# Patient Record
Sex: MALE | Race: WHITE | NOT HISPANIC OR LATINO
[De-identification: names, ages, dates, MRNs, and addresses within clinical notes are randomized per-mention and may not be internally consistent; named-entity substitution may affect disease eponyms.]

---

## 2020-06-12 ENCOUNTER — ERX REFILL RESPONSE (OUTPATIENT)
Age: 65
End: 2020-06-12

## 2020-06-12 RX ORDER — METOCLOPRAMIDE 5 MG/1
TAKE 1 TABLET BY ORAL ROUTE TID FOR 14 TABLET ORAL
Qty: 42 | Refills: 1

## 2020-09-15 ENCOUNTER — TELEPHONE ENCOUNTER (OUTPATIENT)
Dept: URBAN - METROPOLITAN AREA CLINIC 54 | Facility: CLINIC | Age: 65
End: 2020-09-15

## 2020-09-15 RX ORDER — METOCLOPRAMIDE 5 MG/1
TAKE 1 TABLET BY ORAL ROUTE TID TABLET ORAL THREE TIMES A DAY
Qty: 90 | Refills: 0

## 2020-10-28 ENCOUNTER — TELEPHONE ENCOUNTER (OUTPATIENT)
Dept: URBAN - METROPOLITAN AREA CLINIC 54 | Facility: CLINIC | Age: 65
End: 2020-10-28

## 2020-10-28 RX ORDER — FAMOTIDINE 40 MG/1
1 TABLET AT BEDTIME TABLET, FILM COATED ORAL ONCE A DAY
Qty: 90 TABLET | Refills: 2 | OUTPATIENT
Start: 2020-10-28

## 2020-11-10 ENCOUNTER — OFFICE VISIT (OUTPATIENT)
Dept: RURAL CLINIC 6 | Facility: CLINIC | Age: 65
End: 2020-11-10

## 2020-11-10 RX ORDER — NEBIVOLOL HYDROCHLORIDE 5 MG/1
1/2 TABLET DAILY TABLET ORAL
Qty: 0 | Refills: 0 | COMMUNITY
Start: 1900-01-01

## 2020-11-10 RX ORDER — PROMETHAZINE HYDROCHLORIDE 25 MG/1
TAKE 1 TABLET (25 MG) BY ORAL ROUTE ONCE DAILY AT BEDTIME FOR 30 DAYS TABLET ORAL
Qty: 30 | Refills: 11 | COMMUNITY
Start: 2018-09-05

## 2020-11-10 RX ORDER — VALSARTAN AND HYDROCHLOROTHIAZIDE 320; 25 MG/1; MG/1
TABLET, FILM COATED ORAL
Qty: 0 | Refills: 0 | COMMUNITY
Start: 1900-01-01

## 2020-11-10 RX ORDER — FAMOTIDINE 40 MG/1
TAKE 1 TABLET (40 MG) BY ORAL ROUTE ONCE DAILY AT BEDTIME TABLET ORAL 1
Qty: 0 | Refills: 0 | COMMUNITY
Start: 1900-01-01

## 2020-11-10 RX ORDER — ASPIRIN 81 MG/1
TAKE 1 TABLET (81 MG) BY ORAL ROUTE ONCE DAILY TABLET, COATED ORAL 1
Qty: 0 | Refills: 0 | COMMUNITY
Start: 1900-01-01

## 2020-11-10 RX ORDER — OMEPRAZOLE 40 MG/1
TAKE 1 CAPSULE TWICE A DAY 30 MINUTES PRIOR TO FIRST AND LAST MEALS CAPSULE, DELAYED RELEASE PELLETS ORAL
Qty: 60 | Refills: 11 | COMMUNITY
Start: 2019-10-11

## 2020-11-10 RX ORDER — METOCLOPRAMIDE 5 MG/1
TAKE 1 TABLET BY ORAL ROUTE TID TABLET ORAL THREE TIMES A DAY
Qty: 90 | Refills: 0 | COMMUNITY

## 2020-11-10 RX ORDER — AMLODIPINE BESYLATE 10 MG/1
TAKE 1 TABLET (10 MG) BY ORAL ROUTE ONCE DAILY TABLET ORAL 1
Qty: 0 | Refills: 0 | COMMUNITY
Start: 1900-01-01

## 2020-11-10 RX ORDER — ATORVASTATIN CALCIUM 10 MG/1
TAKE 1 TABLET (10 MG) BY ORAL ROUTE ONCE DAILY TABLET, FILM COATED ORAL 1
Qty: 0 | Refills: 0 | COMMUNITY
Start: 1900-01-01

## 2020-11-10 RX ORDER — GABAPENTIN 600 MG
TAKE 1 TABLET (600 MG) BY ORAL ROUTE 3 TIMES PER DAY TABLET ORAL
Qty: 0 | Refills: 0 | COMMUNITY
Start: 1900-01-01

## 2020-11-10 RX ORDER — BAICALIN/CATECHIN 500 MG
CAPSULE ORAL
Qty: 0 | Refills: 0 | COMMUNITY
Start: 1900-01-01

## 2020-11-10 RX ORDER — HYDRALAZINE HYDROCHLORIDE 25 MG/1
TABLET ORAL
Qty: 0 | Refills: 0 | COMMUNITY
Start: 1900-01-01

## 2020-11-10 RX ORDER — ONDANSETRON 4 MG/1
DISSOLVE  1 TABLET BY ORAL ROUTE Q6H PRN NAUSEA TABLET, ORALLY DISINTEGRATING ORAL
Qty: 30 | Refills: 11 | COMMUNITY
Start: 2020-05-12 | End: 2021-05-07

## 2020-11-10 RX ORDER — FAMOTIDINE 40 MG/1
1 TABLET AT BEDTIME TABLET, FILM COATED ORAL ONCE A DAY
Qty: 90 TABLET | Refills: 2 | COMMUNITY
Start: 2020-10-28

## 2020-11-10 RX ORDER — SERTRALINE 100 MG/1
TABLET, FILM COATED ORAL
Qty: 0 | Refills: 0 | COMMUNITY
Start: 1900-01-01

## 2020-11-10 RX ORDER — TIZANIDINE HYDROCHLORIDE 4 MG/1
CAPSULE ORAL
Qty: 0 | Refills: 0 | COMMUNITY
Start: 1900-01-01

## 2020-11-17 ENCOUNTER — OFFICE VISIT (OUTPATIENT)
Dept: RURAL CLINIC 6 | Facility: CLINIC | Age: 65
End: 2020-11-17

## 2020-11-17 RX ORDER — FAMOTIDINE 40 MG/1
1 TABLET AT BEDTIME TABLET, FILM COATED ORAL ONCE A DAY
Qty: 90 TABLET | Refills: 2 | COMMUNITY
Start: 2020-10-28

## 2020-11-17 RX ORDER — ATORVASTATIN CALCIUM 10 MG/1
TAKE 1 TABLET (10 MG) BY ORAL ROUTE ONCE DAILY TABLET, FILM COATED ORAL 1
Qty: 0 | Refills: 0 | COMMUNITY
Start: 1900-01-01

## 2020-11-17 RX ORDER — GABAPENTIN 600 MG
TAKE 1 TABLET (600 MG) BY ORAL ROUTE 3 TIMES PER DAY TABLET ORAL
Qty: 0 | Refills: 0 | COMMUNITY
Start: 1900-01-01

## 2020-11-17 RX ORDER — HYDRALAZINE HYDROCHLORIDE 25 MG/1
TABLET ORAL
Qty: 0 | Refills: 0 | COMMUNITY
Start: 1900-01-01

## 2020-11-17 RX ORDER — FAMOTIDINE 40 MG/1
TAKE 1 TABLET (40 MG) BY ORAL ROUTE ONCE DAILY AT BEDTIME TABLET ORAL 1
Qty: 0 | Refills: 0 | COMMUNITY
Start: 1900-01-01

## 2020-11-17 RX ORDER — OMEPRAZOLE 40 MG/1
TAKE 1 CAPSULE TWICE A DAY 30 MINUTES PRIOR TO FIRST AND LAST MEALS CAPSULE, DELAYED RELEASE PELLETS ORAL
Qty: 60 | Refills: 11 | COMMUNITY
Start: 2019-10-11

## 2020-11-17 RX ORDER — AMLODIPINE BESYLATE 10 MG/1
TAKE 1 TABLET (10 MG) BY ORAL ROUTE ONCE DAILY TABLET ORAL 1
Qty: 0 | Refills: 0 | COMMUNITY
Start: 1900-01-01

## 2020-11-17 RX ORDER — SERTRALINE 100 MG/1
TABLET, FILM COATED ORAL
Qty: 0 | Refills: 0 | COMMUNITY
Start: 1900-01-01

## 2020-11-17 RX ORDER — ASPIRIN 81 MG/1
TAKE 1 TABLET (81 MG) BY ORAL ROUTE ONCE DAILY TABLET, COATED ORAL 1
Qty: 0 | Refills: 0 | COMMUNITY
Start: 1900-01-01

## 2020-11-17 RX ORDER — PROMETHAZINE HYDROCHLORIDE 25 MG/1
TAKE 1 TABLET (25 MG) BY ORAL ROUTE ONCE DAILY AT BEDTIME FOR 30 DAYS TABLET ORAL
Qty: 30 | Refills: 11 | COMMUNITY
Start: 2018-09-05

## 2020-11-17 RX ORDER — NEBIVOLOL HYDROCHLORIDE 5 MG/1
1/2 TABLET DAILY TABLET ORAL
Qty: 0 | Refills: 0 | COMMUNITY
Start: 1900-01-01

## 2020-11-17 RX ORDER — BAICALIN/CATECHIN 500 MG
CAPSULE ORAL
Qty: 0 | Refills: 0 | COMMUNITY
Start: 1900-01-01

## 2020-11-17 RX ORDER — TIZANIDINE HYDROCHLORIDE 4 MG/1
CAPSULE ORAL
Qty: 0 | Refills: 0 | COMMUNITY
Start: 1900-01-01

## 2020-11-17 RX ORDER — VALSARTAN AND HYDROCHLOROTHIAZIDE 320; 25 MG/1; MG/1
TABLET, FILM COATED ORAL
Qty: 0 | Refills: 0 | COMMUNITY
Start: 1900-01-01

## 2020-11-17 RX ORDER — METOCLOPRAMIDE 5 MG/1
TAKE 1 TABLET BY ORAL ROUTE TID TABLET ORAL THREE TIMES A DAY
Qty: 90 | Refills: 0 | COMMUNITY

## 2020-11-17 RX ORDER — ONDANSETRON 4 MG/1
DISSOLVE  1 TABLET BY ORAL ROUTE Q6H PRN NAUSEA TABLET, ORALLY DISINTEGRATING ORAL
Qty: 30 | Refills: 11 | COMMUNITY
Start: 2020-05-12 | End: 2021-05-07

## 2020-12-09 ENCOUNTER — ERX REFILL RESPONSE (OUTPATIENT)
Age: 65
End: 2020-12-09

## 2020-12-09 RX ORDER — OMEPRAZOLE 40 MG/1
TAKE 1 CAPSULE TWICE A DAY 30 MINUTES PRIOR TO FIRST AND LAST MEALS CAPSULE, DELAYED RELEASE ORAL
Qty: 60 | Refills: 0

## 2020-12-22 ENCOUNTER — TELEPHONE ENCOUNTER (OUTPATIENT)
Dept: RURAL CLINIC 6 | Facility: CLINIC | Age: 65
End: 2020-12-22

## 2020-12-22 RX ORDER — METOCLOPRAMIDE 5 MG/1
TAKE 1 TABLET BY ORAL ROUTE TABLET ORAL THREE TIMES A DAY
Qty: 90 | Refills: 0

## 2021-01-05 ENCOUNTER — OFFICE VISIT (OUTPATIENT)
Dept: RURAL CLINIC 6 | Facility: CLINIC | Age: 66
End: 2021-01-05
Payer: MEDICARE

## 2021-01-05 DIAGNOSIS — Z12.11 COLON CANCER SCREENING: ICD-10-CM

## 2021-01-05 DIAGNOSIS — K31.84 GASTROPARESIS: ICD-10-CM

## 2021-01-05 PROCEDURE — G8427 DOCREV CUR MEDS BY ELIG CLIN: HCPCS | Performed by: INTERNAL MEDICINE

## 2021-01-05 PROCEDURE — G9903 PT SCRN TBCO ID AS NON USER: HCPCS | Performed by: INTERNAL MEDICINE

## 2021-01-05 PROCEDURE — 1036F TOBACCO NON-USER: CPT | Performed by: INTERNAL MEDICINE

## 2021-01-05 PROCEDURE — G8420 CALC BMI NORM PARAMETERS: HCPCS | Performed by: INTERNAL MEDICINE

## 2021-01-05 PROCEDURE — 99214 OFFICE O/P EST MOD 30 MIN: CPT | Performed by: INTERNAL MEDICINE

## 2021-01-05 PROCEDURE — 3017F COLORECTAL CA SCREEN DOC REV: CPT | Performed by: INTERNAL MEDICINE

## 2021-01-05 PROCEDURE — G8483 FLU IMM NO ADMIN DOC REA: HCPCS | Performed by: INTERNAL MEDICINE

## 2021-01-05 RX ORDER — FAMOTIDINE 40 MG/1
1 TABLET AT BEDTIME TABLET, FILM COATED ORAL ONCE A DAY
Qty: 90 TABLET | Refills: 2 | Status: ACTIVE | COMMUNITY
Start: 2020-10-28

## 2021-01-05 RX ORDER — SERTRALINE 100 MG/1
TABLET, FILM COATED ORAL
Qty: 0 | Refills: 0 | Status: ACTIVE | COMMUNITY
Start: 1900-01-01

## 2021-01-05 RX ORDER — ATORVASTATIN CALCIUM 10 MG/1
TAKE 1 TABLET (10 MG) BY ORAL ROUTE ONCE DAILY TABLET, FILM COATED ORAL 1
Qty: 0 | Refills: 0 | Status: ACTIVE | COMMUNITY
Start: 1900-01-01

## 2021-01-05 RX ORDER — FAMOTIDINE 40 MG/1
TAKE 1 TABLET (40 MG) BY ORAL ROUTE ONCE DAILY AT BEDTIME TABLET ORAL 1
Qty: 0 | Refills: 0 | Status: ACTIVE | COMMUNITY
Start: 1900-01-01

## 2021-01-05 RX ORDER — GABAPENTIN 600 MG
TAKE 1 TABLET (600 MG) BY ORAL ROUTE 3 TIMES PER DAY TABLET ORAL
Qty: 0 | Refills: 0 | Status: ACTIVE | COMMUNITY
Start: 1900-01-01

## 2021-01-05 RX ORDER — VALSARTAN AND HYDROCHLOROTHIAZIDE 320; 25 MG/1; MG/1
TABLET, FILM COATED ORAL
Qty: 0 | Refills: 0 | Status: ACTIVE | COMMUNITY
Start: 1900-01-01

## 2021-01-05 RX ORDER — NEBIVOLOL HYDROCHLORIDE 5 MG/1
1/2 TABLET DAILY TABLET ORAL
Qty: 0 | Refills: 0 | Status: ACTIVE | COMMUNITY
Start: 1900-01-01

## 2021-01-05 RX ORDER — HYDRALAZINE HYDROCHLORIDE 25 MG/1
TABLET ORAL
Qty: 0 | Refills: 0 | Status: ACTIVE | COMMUNITY
Start: 1900-01-01

## 2021-01-05 RX ORDER — ASPIRIN 81 MG/1
TAKE 1 TABLET (81 MG) BY ORAL ROUTE ONCE DAILY TABLET, COATED ORAL 1
Qty: 0 | Refills: 0 | Status: ACTIVE | COMMUNITY
Start: 1900-01-01

## 2021-01-05 RX ORDER — OMEPRAZOLE 40 MG/1
TAKE 1 CAPSULE TWICE A DAY 30 MINUTES PRIOR TO FIRST AND LAST MEALS CAPSULE, DELAYED RELEASE ORAL
Qty: 60 | Refills: 0 | Status: ACTIVE | COMMUNITY

## 2021-01-05 RX ORDER — METOCLOPRAMIDE 5 MG/1
TAKE 1 TABLET BY ORAL ROUTE TABLET ORAL THREE TIMES A DAY
Qty: 90 | Refills: 0 | Status: ACTIVE | COMMUNITY

## 2021-01-05 RX ORDER — BAICALIN/CATECHIN 500 MG
CAPSULE ORAL
Qty: 0 | Refills: 0 | Status: ACTIVE | COMMUNITY
Start: 1900-01-01

## 2021-01-05 RX ORDER — PROMETHAZINE HYDROCHLORIDE 25 MG/1
TAKE 1 TABLET (25 MG) BY ORAL ROUTE ONCE DAILY AT BEDTIME FOR 30 DAYS TABLET ORAL
Qty: 30 | Refills: 11 | Status: ACTIVE | COMMUNITY
Start: 2018-09-05

## 2021-01-05 RX ORDER — TIZANIDINE HYDROCHLORIDE 4 MG/1
CAPSULE ORAL
Qty: 0 | Refills: 0 | Status: ACTIVE | COMMUNITY
Start: 1900-01-01

## 2021-01-05 RX ORDER — ONDANSETRON 4 MG/1
DISSOLVE  1 TABLET BY ORAL ROUTE Q6H PRN NAUSEA TABLET, ORALLY DISINTEGRATING ORAL
Qty: 30 | Refills: 11 | Status: ACTIVE | COMMUNITY
Start: 2020-05-12 | End: 2021-05-07

## 2021-01-05 RX ORDER — AMLODIPINE BESYLATE 10 MG/1
TAKE 1 TABLET (10 MG) BY ORAL ROUTE ONCE DAILY TABLET ORAL 1
Qty: 0 | Refills: 0 | Status: ACTIVE | COMMUNITY
Start: 1900-01-01

## 2021-01-05 NOTE — HPI-TODAY'S VISIT:
65-year-old man with chronic gastroparesis on long-term low-dose metoclopramide.  He has significant nausea when he stops the metoclopramide.  He does make periodic efforts to lower the dose or discontinue it.  There have been no abdominal pain or dysphagia.  Heartburn is well controlled with famotidine and/or omeprazole he takes an occasional promethazine or ondansetron his heart has been stable he has no other major health issues he has had no gross psychiatric side effects from the metoclopramide.  With this visit we have had a discussion about the long-term risk of tardive dyskinesia and the possibility of permanent tardive dyskinesia even after the drug discontinuance.  Bowels are moving regularly.  His last colonoscopy was in 2011 and will be due later this year.  I will postpone this exam until later in the year 2 hopefully perform the procedure with the coronavirus and better control.

## 2021-01-05 NOTE — HPI-OTHER HISTORIES
>> prior visit The patient is a 60 year old /White male , who presents in follow-up for further management of his chronic early AM nausea without emesis. He has also noted postprandial epigastric pain. When I last saw him on 6/29/16 he had noted intermittent feeling a "knot" in his abdomen epigastrically. It was a pain. He saw a GI in [place] and had an EGD/CT chest/abdomen that were unremarkable. He stated he was not prescribed any medication. He noted some occasional nausea at that time. Denied emesis. When he had epigastric pain it could last up to a 1/2 day. It was usually after eating and occured more after fried food. He notes today his nausea is unchanged. RUQ US and gastric empting scan were unremarkable. More recent HIDA noted a decreased EF and administration of Nepro reproduced his discomfort. He had his gallbladder removed after Sept 2015. He noted his symptoms were better. He could have an urgent BM. 7/13/16 EGD - benign fundic gland polyps. Otherwise, normal. Biopsies unremarkable. 9/6/16 TSH/FT4, CBC, CMP, CRP normal. Cortisol 7.7 in AM. CT sinuses 9/9/16 - no evidence of inflammatory sinus disease. He has continued on omeprazole 40 mg BID and ranitidine 150 mg at bedtime. He was started on Reglan 10 mg at bedtime on 10/12/16 after being consented where I reviewed the risk of tardive dyskinesia. Past Medical History Disease Name Date Onset Notes arthritis -- -Cataracts, bilateral -- -CDD (congenital diaphragmatic defect) -- -Cervical disc disease -- -CHF (congestive heart failure) -- -CT Scan 01/15/15 Chest/abdomen with contrast - Colonic diverticulosis. Otherwise, unremarkable. Diverticulosis -- -Follow up -- -Gastric Emptying Study 08/11/15 Normal. T1/2 37 mins. GERD/Nausea Today, he is being seen after being lost to follow-up.  He continues on omeprazole 40 mg BID, ranitidine 150 mg at bedtime, Reglan 10 mg at bedtime.  He states he changed his diet and he is eating better.  He has alot less nausea and is not needing Zofran.  He lost weight intentionally. 1/22/19: Patient returns for follow up. He reports acute onset of weakness, tingling, nausea, diaphoresis and chest pain this past Wednesday and was hospitalized at [facility] from 1/16-1/17. He was seen by cardiology and cardiac workup was negative. He followed up with his PCP, Dr. Strong, this past Friday who increased his Reglan to 10 mg BID. He is feeling much better now. He also takes Phenergan nightly, Zofran as needed, omeprazole BID and ranitidine at bedtime. He c/o mild burning in his stomach, otherwise no complaints. FOLLOW UP 4/30/19-PATIENT PRESENTS FOR FOLLOW UP. HE IS DOING WELL. HE IS TAKING OMEPRAZOLE DAILY AND RANITIDINE AT NIGHT. HE IS ON REGLAN ONCE DAILY. DISCUSSED WITH PATIENT TO STOP MEDICATION. HE HAS LOST WEIGHT. >>10/11/2019. He ran out of omeprazole and had recurrent GERD symptoms, without dysphagia. Bowels OK, no bleeding. Back on OTC omeprazole-improved. Also takes ranitidine qhs. Back today mainly for Rx renewal. No further nausea, no abdominal pain. >>5/12/2020 No change. Wt stable. Bowels OK. Minimal abd pain/gas. Occasional GERD symptoms. Occ nausea, No vomiting, no bleeding. Still using metoclopramide 5 mg hs, no side effects

## 2021-01-06 ENCOUNTER — TELEPHONE ENCOUNTER (OUTPATIENT)
Dept: URBAN - METROPOLITAN AREA CLINIC 92 | Facility: CLINIC | Age: 66
End: 2021-01-06

## 2021-01-06 RX ORDER — OMEPRAZOLE 40 MG/1
TAKE 1 CAPSULE TWICE A DAY 30 MINUTES PRIOR TO FIRST AND LAST MEALS CAPSULE, DELAYED RELEASE ORAL
Qty: 60

## 2021-01-06 RX ORDER — METOCLOPRAMIDE 5 MG/1
TAKE 1 TABLET BY ORAL ROUTE TABLET ORAL THREE TIMES A DAY
Qty: 90

## 2021-04-07 ENCOUNTER — TELEPHONE ENCOUNTER (OUTPATIENT)
Dept: URBAN - METROPOLITAN AREA CLINIC 54 | Facility: CLINIC | Age: 66
End: 2021-04-07

## 2021-04-07 RX ORDER — FAMOTIDINE 40 MG/1
TAKE 1 TABLET (40 MG) BY ORAL ROUTE ONCE DAILY AT BEDTIME TABLET ORAL 1
Qty: 90 | Refills: 1

## 2021-04-07 RX ORDER — OMEPRAZOLE 40 MG/1
TAKE 1 CAPSULE TWICE A DAY 30 MINUTES PRIOR TO FIRST AND LAST MEALS CAPSULE, DELAYED RELEASE ORAL TWICE A DAY
Qty: 180 | Refills: 1

## 2021-07-12 ENCOUNTER — TELEPHONE ENCOUNTER (OUTPATIENT)
Dept: URBAN - METROPOLITAN AREA CLINIC 54 | Facility: CLINIC | Age: 66
End: 2021-07-12

## 2021-07-12 RX ORDER — METOCLOPRAMIDE 5 MG/1
TAKE 1 TABLET BY ORAL ROUTE TABLET ORAL THREE TIMES A DAY
Qty: 90 | Refills: 0

## 2021-07-20 ENCOUNTER — WEB ENCOUNTER (OUTPATIENT)
Dept: RURAL CLINIC 2 | Facility: CLINIC | Age: 66
End: 2021-07-20

## 2021-07-20 ENCOUNTER — OFFICE VISIT (OUTPATIENT)
Dept: RURAL CLINIC 6 | Facility: CLINIC | Age: 66
End: 2021-07-20
Payer: MEDICARE

## 2021-07-20 DIAGNOSIS — K31.84 GASTROPARESIS: ICD-10-CM

## 2021-07-20 PROCEDURE — 99203 OFFICE O/P NEW LOW 30 MIN: CPT | Performed by: INTERNAL MEDICINE

## 2021-07-20 RX ORDER — PROMETHAZINE HYDROCHLORIDE 25 MG/1
TAKE 1 TABLET (25 MG) BY ORAL ROUTE ONCE DAILY AT BEDTIME FOR 30 DAYS TABLET ORAL
Qty: 30 | Refills: 11 | Status: ACTIVE | COMMUNITY
Start: 2018-09-05

## 2021-07-20 RX ORDER — SERTRALINE 100 MG/1
TABLET, FILM COATED ORAL
Qty: 0 | Refills: 0 | Status: ACTIVE | COMMUNITY
Start: 1900-01-01

## 2021-07-20 RX ORDER — VALSARTAN AND HYDROCHLOROTHIAZIDE 320; 25 MG/1; MG/1
TABLET, FILM COATED ORAL
Qty: 0 | Refills: 0 | Status: ACTIVE | COMMUNITY
Start: 1900-01-01

## 2021-07-20 RX ORDER — METOCLOPRAMIDE 5 MG/1
TAKE 1 TABLET BY ORAL ROUTE TABLET ORAL THREE TIMES A DAY
Qty: 90 | Status: ACTIVE | COMMUNITY

## 2021-07-20 RX ORDER — BAICALIN/CATECHIN 500 MG
CAPSULE ORAL
Qty: 0 | Refills: 0 | Status: ACTIVE | COMMUNITY
Start: 1900-01-01

## 2021-07-20 RX ORDER — AMLODIPINE BESYLATE 10 MG/1
TAKE 1 TABLET (10 MG) BY ORAL ROUTE ONCE DAILY TABLET ORAL 1
Qty: 0 | Refills: 0 | Status: ACTIVE | COMMUNITY
Start: 1900-01-01

## 2021-07-20 RX ORDER — GABAPENTIN 600 MG
TAKE 1 TABLET (600 MG) BY ORAL ROUTE 3 TIMES PER DAY TABLET ORAL
Qty: 0 | Refills: 0 | Status: ACTIVE | COMMUNITY
Start: 1900-01-01

## 2021-07-20 RX ORDER — METOCLOPRAMIDE 5 MG/1
TAKE 1 TABLET BY ORAL ROUTE TABLET ORAL TWICE A DAY
Qty: 60 | Refills: 2

## 2021-07-20 RX ORDER — FAMOTIDINE 40 MG/1
1 TABLET AT BEDTIME TABLET, FILM COATED ORAL ONCE A DAY
Qty: 90 TABLET | Refills: 2 | Status: ACTIVE | COMMUNITY
Start: 2020-10-28

## 2021-07-20 RX ORDER — NEBIVOLOL HYDROCHLORIDE 5 MG/1
1/2 TABLET DAILY TABLET ORAL
Qty: 0 | Refills: 0 | Status: ACTIVE | COMMUNITY
Start: 1900-01-01

## 2021-07-20 RX ORDER — TIZANIDINE HYDROCHLORIDE 4 MG/1
CAPSULE ORAL
Qty: 0 | Refills: 0 | Status: ACTIVE | COMMUNITY
Start: 1900-01-01

## 2021-07-20 RX ORDER — ASPIRIN 81 MG/1
TAKE 1 TABLET (81 MG) BY ORAL ROUTE ONCE DAILY TABLET, COATED ORAL 1
Qty: 0 | Refills: 0 | Status: ACTIVE | COMMUNITY
Start: 1900-01-01

## 2021-07-20 RX ORDER — OMEPRAZOLE 40 MG/1
TAKE 1 CAPSULE TWICE A DAY 30 MINUTES PRIOR TO FIRST AND LAST MEALS CAPSULE, DELAYED RELEASE ORAL TWICE A DAY
Qty: 180 | Refills: 1

## 2021-07-20 RX ORDER — ATORVASTATIN CALCIUM 10 MG/1
TAKE 1 TABLET (10 MG) BY ORAL ROUTE ONCE DAILY TABLET, FILM COATED ORAL 1
Qty: 0 | Refills: 0 | Status: ACTIVE | COMMUNITY
Start: 1900-01-01

## 2021-07-20 RX ORDER — FAMOTIDINE 40 MG/1
TAKE 1 TABLET (40 MG) BY ORAL ROUTE ONCE DAILY AT BEDTIME TABLET ORAL 1
Qty: 90 | Refills: 1

## 2021-07-20 RX ORDER — HYDRALAZINE HYDROCHLORIDE 25 MG/1
TABLET ORAL
Qty: 0 | Refills: 0 | Status: ACTIVE | COMMUNITY
Start: 1900-01-01

## 2021-07-20 RX ORDER — OMEPRAZOLE 40 MG/1
TAKE 1 CAPSULE TWICE A DAY 30 MINUTES PRIOR TO FIRST AND LAST MEALS CAPSULE, DELAYED RELEASE ORAL TWICE A DAY
Qty: 180 | Refills: 1 | Status: ACTIVE | COMMUNITY

## 2021-07-20 RX ORDER — FAMOTIDINE 40 MG/1
TAKE 1 TABLET (40 MG) BY ORAL ROUTE ONCE DAILY AT BEDTIME TABLET ORAL 1
Qty: 90 | Refills: 1 | Status: ACTIVE | COMMUNITY

## 2021-07-20 NOTE — HPI-TODAY'S VISIT:
66-year-old male with gastroparesis, GERD on longstanding low-dose metoclopramide.  He denies depression, agitation, anxiety or abnormal movement related to the metoclopramide usage.  He has significant abdominal discomfort burning sensation and nausea if he misses it or tries to stop the metoclopramide.  He is taking 10 mg once a day.  He also is on a PPI.  His bowels move normally 2-3 times a day without bleeding.  His last colonoscopy was 9 years ago.  There is no family history of colon cancer no personal or family history of polyps.

## 2021-07-20 NOTE — PHYSICAL EXAM CONSTITUTIONAL:
well developed, well nourished , in no acute distress , ambulating without difficulty , normal communication ability. No tardive dyskinesia, rigidity or tremor.

## 2022-01-18 ENCOUNTER — OFFICE VISIT (OUTPATIENT)
Dept: RURAL CLINIC 6 | Facility: CLINIC | Age: 67
End: 2022-01-18

## 2022-01-18 RX ORDER — SERTRALINE 100 MG/1
TABLET, FILM COATED ORAL
Qty: 0 | Refills: 0 | COMMUNITY
Start: 1900-01-01

## 2022-01-18 RX ORDER — FAMOTIDINE 40 MG/1
1 TABLET AT BEDTIME TABLET, FILM COATED ORAL ONCE A DAY
Qty: 90 TABLET | Refills: 2 | COMMUNITY
Start: 2020-10-28

## 2022-01-18 RX ORDER — BAICALIN/CATECHIN 500 MG
CAPSULE ORAL
Qty: 0 | Refills: 0 | COMMUNITY
Start: 1900-01-01

## 2022-01-18 RX ORDER — ASPIRIN 81 MG/1
TAKE 1 TABLET (81 MG) BY ORAL ROUTE ONCE DAILY TABLET, COATED ORAL 1
Qty: 0 | Refills: 0 | COMMUNITY
Start: 1900-01-01

## 2022-01-18 RX ORDER — AMLODIPINE BESYLATE 10 MG/1
TAKE 1 TABLET (10 MG) BY ORAL ROUTE ONCE DAILY TABLET ORAL 1
Qty: 0 | Refills: 0 | COMMUNITY
Start: 1900-01-01

## 2022-01-18 RX ORDER — ATORVASTATIN CALCIUM 10 MG/1
TAKE 1 TABLET (10 MG) BY ORAL ROUTE ONCE DAILY TABLET, FILM COATED ORAL 1
Qty: 0 | Refills: 0 | COMMUNITY
Start: 1900-01-01

## 2022-01-18 RX ORDER — PROMETHAZINE HYDROCHLORIDE 25 MG/1
TAKE 1 TABLET (25 MG) BY ORAL ROUTE ONCE DAILY AT BEDTIME FOR 30 DAYS TABLET ORAL
Qty: 30 | Refills: 11 | COMMUNITY
Start: 2018-09-05

## 2022-01-18 RX ORDER — HYDRALAZINE HYDROCHLORIDE 25 MG/1
TABLET ORAL
Qty: 0 | Refills: 0 | COMMUNITY
Start: 1900-01-01

## 2022-01-18 RX ORDER — OMEPRAZOLE 40 MG/1
TAKE 1 CAPSULE TWICE A DAY 30 MINUTES PRIOR TO FIRST AND LAST MEALS CAPSULE, DELAYED RELEASE ORAL TWICE A DAY
Qty: 180 | Refills: 1 | COMMUNITY

## 2022-01-18 RX ORDER — NEBIVOLOL HYDROCHLORIDE 5 MG/1
1/2 TABLET DAILY TABLET ORAL
Qty: 0 | Refills: 0 | COMMUNITY
Start: 1900-01-01

## 2022-01-18 RX ORDER — VALSARTAN AND HYDROCHLOROTHIAZIDE 320; 25 MG/1; MG/1
TABLET, FILM COATED ORAL
Qty: 0 | Refills: 0 | COMMUNITY
Start: 1900-01-01

## 2022-01-18 RX ORDER — GABAPENTIN 600 MG
TAKE 1 TABLET (600 MG) BY ORAL ROUTE 3 TIMES PER DAY TABLET ORAL
Qty: 0 | Refills: 0 | COMMUNITY
Start: 1900-01-01

## 2022-01-18 RX ORDER — METOCLOPRAMIDE 5 MG/1
TAKE 1 TABLET BY ORAL ROUTE TABLET ORAL TWICE A DAY
Qty: 60 | Refills: 2 | COMMUNITY

## 2022-01-18 RX ORDER — FAMOTIDINE 40 MG/1
TAKE 1 TABLET (40 MG) BY ORAL ROUTE ONCE DAILY AT BEDTIME TABLET ORAL 1
Qty: 90 | Refills: 1 | COMMUNITY

## 2022-01-18 RX ORDER — TIZANIDINE HYDROCHLORIDE 4 MG/1
CAPSULE ORAL
Qty: 0 | Refills: 0 | COMMUNITY
Start: 1900-01-01

## 2022-02-15 ENCOUNTER — TELEPHONE ENCOUNTER (OUTPATIENT)
Dept: RURAL CLINIC 2 | Facility: CLINIC | Age: 67
End: 2022-02-15

## 2022-02-15 ENCOUNTER — OFFICE VISIT (OUTPATIENT)
Dept: RURAL CLINIC 6 | Facility: CLINIC | Age: 67
End: 2022-02-15
Payer: MEDICARE

## 2022-02-15 DIAGNOSIS — K21.9 GERD WITHOUT ESOPHAGITIS: ICD-10-CM

## 2022-02-15 DIAGNOSIS — Z12.11 SCREEN FOR COLON CANCER: ICD-10-CM

## 2022-02-15 DIAGNOSIS — K31.84 GASTROPARESIS: ICD-10-CM

## 2022-02-15 PROCEDURE — 99213 OFFICE O/P EST LOW 20 MIN: CPT | Performed by: INTERNAL MEDICINE

## 2022-02-15 RX ORDER — HYDRALAZINE HYDROCHLORIDE 25 MG/1
TABLET ORAL
Qty: 0 | Refills: 0 | Status: ACTIVE | COMMUNITY
Start: 1900-01-01

## 2022-02-15 RX ORDER — FAMOTIDINE 40 MG/1
1 TABLET AT BEDTIME TABLET, FILM COATED ORAL ONCE A DAY
Qty: 90 TABLET | Refills: 2 | Status: ACTIVE | COMMUNITY
Start: 2020-10-28

## 2022-02-15 RX ORDER — ATORVASTATIN CALCIUM 10 MG/1
TAKE 1 TABLET (10 MG) BY ORAL ROUTE ONCE DAILY TABLET, FILM COATED ORAL 1
Qty: 0 | Refills: 0 | Status: ACTIVE | COMMUNITY
Start: 1900-01-01

## 2022-02-15 RX ORDER — TIZANIDINE HYDROCHLORIDE 4 MG/1
CAPSULE ORAL
Qty: 0 | Refills: 0 | Status: ACTIVE | COMMUNITY
Start: 1900-01-01

## 2022-02-15 RX ORDER — FAMOTIDINE 40 MG/1
TAKE 1 TABLET (40 MG) BY ORAL ROUTE ONCE DAILY AT BEDTIME TABLET ORAL 1
Qty: 90 | Refills: 1 | Status: ACTIVE | COMMUNITY

## 2022-02-15 RX ORDER — ASPIRIN 81 MG/1
TAKE 1 TABLET (81 MG) BY ORAL ROUTE ONCE DAILY TABLET, COATED ORAL 1
Qty: 0 | Refills: 0 | Status: ACTIVE | COMMUNITY
Start: 1900-01-01

## 2022-02-15 RX ORDER — METOCLOPRAMIDE 5 MG/1
TAKE 1 TABLET BY ORAL ROUTE TABLET ORAL TWICE A DAY
Qty: 60 | Refills: 2 | Status: ACTIVE | COMMUNITY

## 2022-02-15 RX ORDER — AMLODIPINE BESYLATE 10 MG/1
TAKE 1 TABLET (10 MG) BY ORAL ROUTE ONCE DAILY TABLET ORAL 1
Qty: 0 | Refills: 0 | Status: ACTIVE | COMMUNITY
Start: 1900-01-01

## 2022-02-15 RX ORDER — NEBIVOLOL HYDROCHLORIDE 5 MG/1
1/2 TABLET DAILY TABLET ORAL
Qty: 0 | Refills: 0 | Status: ACTIVE | COMMUNITY
Start: 1900-01-01

## 2022-02-15 RX ORDER — METOCLOPRAMIDE HYDROCHLORIDE 5 MG/1
1 TABLET BEFORE MEALS TABLET ORAL TWICE A DAY
Qty: 60 TABLET | Refills: 0

## 2022-02-15 RX ORDER — OMEPRAZOLE 40 MG/1
TAKE 1 CAPSULE TWICE A DAY 30 MINUTES PRIOR TO FIRST AND LAST MEALS CAPSULE, DELAYED RELEASE ORAL TWICE A DAY
Qty: 180 | Refills: 1 | Status: ACTIVE | COMMUNITY

## 2022-02-15 RX ORDER — BAICALIN/CATECHIN 500 MG
CAPSULE ORAL
Qty: 0 | Refills: 0 | Status: ACTIVE | COMMUNITY
Start: 1900-01-01

## 2022-02-15 RX ORDER — PROMETHAZINE HYDROCHLORIDE 25 MG/1
TAKE 1 TABLET (25 MG) BY ORAL ROUTE ONCE DAILY AT BEDTIME FOR 30 DAYS TABLET ORAL
Qty: 30 | Refills: 11 | Status: ACTIVE | COMMUNITY
Start: 2018-09-05

## 2022-02-15 RX ORDER — GABAPENTIN 600 MG
TAKE 1 TABLET (600 MG) BY ORAL ROUTE 3 TIMES PER DAY TABLET ORAL
Qty: 0 | Refills: 0 | Status: ACTIVE | COMMUNITY
Start: 1900-01-01

## 2022-02-15 RX ORDER — SERTRALINE 100 MG/1
TABLET, FILM COATED ORAL
Qty: 0 | Refills: 0 | Status: ACTIVE | COMMUNITY
Start: 1900-01-01

## 2022-02-15 RX ORDER — VALSARTAN AND HYDROCHLOROTHIAZIDE 320; 25 MG/1; MG/1
TABLET, FILM COATED ORAL
Qty: 0 | Refills: 0 | Status: ACTIVE | COMMUNITY
Start: 1900-01-01

## 2022-02-15 NOTE — HPI-TODAY'S VISIT:
This is a 66-year-old man with chronic burning epigastric discomfort and documented gastroparesis.  He has been on low-dose metoclopramide for several years.  He recently ran out of medication and was able to go for a month without medication but then had recurrent burning sensation and occasional regurgitation but no dysphagia.  He has suffered no neurological or psychiatric complications from the long term metoclopramide therapy.  We have discussed on several occasions that the risk of continuing this medication and the relative absence of suitable alternatives.  He is scheduled to see a bariatric surgeon later this month for obesity and hyperlipidemia and cardiac issues.  He denies chest pain or heart attack.  He has no history of cardiac valvular disease and no respiratory problems.  He has no prior problems with anesthesia.  He is on aspirin but no other anticoagulants.

## 2022-02-15 NOTE — PHYSICAL EXAM CONSTITUTIONAL:
well developed, obese , in no acute distress , ambulating without difficulty , normal communication ability No tardive dyskinesia, tremor, agitation or depression

## 2022-02-22 ENCOUNTER — TELEPHONE ENCOUNTER (OUTPATIENT)
Dept: RURAL CLINIC 6 | Facility: CLINIC | Age: 67
End: 2022-02-22

## 2022-03-03 PROBLEM — 305058001: Status: ACTIVE | Noted: 2022-03-03

## 2022-03-17 ENCOUNTER — TELEPHONE ENCOUNTER (OUTPATIENT)
Dept: RURAL CLINIC 6 | Facility: CLINIC | Age: 67
End: 2022-03-17

## 2022-03-18 ENCOUNTER — TELEPHONE ENCOUNTER (OUTPATIENT)
Dept: URBAN - METROPOLITAN AREA CLINIC 40 | Facility: CLINIC | Age: 67
End: 2022-03-18

## 2022-03-21 ENCOUNTER — OFFICE VISIT (OUTPATIENT)
Dept: URBAN - METROPOLITAN AREA SURGERY CENTER 14 | Facility: SURGERY CENTER | Age: 67
End: 2022-03-21

## 2022-03-21 ENCOUNTER — CLAIMS CREATED FROM THE CLAIM WINDOW (OUTPATIENT)
Dept: URBAN - METROPOLITAN AREA SURGERY CENTER 14 | Facility: SURGERY CENTER | Age: 67
End: 2022-03-21
Payer: MEDICARE

## 2022-03-21 DIAGNOSIS — Z86.010 ADENOMAS PERSONAL HISTORY OF COLONIC POLYPS: ICD-10-CM

## 2022-03-21 DIAGNOSIS — K62.1 ANAL AND RECTAL POLYP: ICD-10-CM

## 2022-03-21 PROCEDURE — G8907 PT DOC NO EVENTS ON DISCHARG: HCPCS | Performed by: INTERNAL MEDICINE

## 2022-03-21 PROCEDURE — 45385 COLONOSCOPY W/LESION REMOVAL: CPT | Performed by: INTERNAL MEDICINE

## 2022-03-21 RX ORDER — TIZANIDINE HYDROCHLORIDE 4 MG/1
CAPSULE ORAL
Qty: 0 | Refills: 0 | Status: ACTIVE | COMMUNITY
Start: 1900-01-01

## 2022-03-21 RX ORDER — FAMOTIDINE 40 MG/1
TAKE 1 TABLET (40 MG) BY ORAL ROUTE ONCE DAILY AT BEDTIME TABLET ORAL 1
Qty: 90 | Refills: 1 | Status: ACTIVE | COMMUNITY

## 2022-03-21 RX ORDER — FAMOTIDINE 40 MG/1
1 TABLET AT BEDTIME TABLET, FILM COATED ORAL ONCE A DAY
Qty: 90 TABLET | Refills: 2 | Status: ACTIVE | COMMUNITY
Start: 2020-10-28

## 2022-03-21 RX ORDER — AMLODIPINE BESYLATE 10 MG/1
TAKE 1 TABLET (10 MG) BY ORAL ROUTE ONCE DAILY TABLET ORAL 1
Qty: 0 | Refills: 0 | Status: ACTIVE | COMMUNITY
Start: 1900-01-01

## 2022-03-21 RX ORDER — HYDRALAZINE HYDROCHLORIDE 25 MG/1
TABLET ORAL
Qty: 0 | Refills: 0 | Status: ACTIVE | COMMUNITY
Start: 1900-01-01

## 2022-03-21 RX ORDER — SERTRALINE 100 MG/1
TABLET, FILM COATED ORAL
Qty: 0 | Refills: 0 | Status: ACTIVE | COMMUNITY
Start: 1900-01-01

## 2022-03-21 RX ORDER — METOCLOPRAMIDE HYDROCHLORIDE 5 MG/1
1 TABLET BEFORE MEALS TABLET ORAL TWICE A DAY
Qty: 60 TABLET | Refills: 0 | Status: ACTIVE | COMMUNITY

## 2022-03-21 RX ORDER — METOCLOPRAMIDE 5 MG/1
TAKE 1 TABLET BY ORAL ROUTE TABLET ORAL TWICE A DAY
Qty: 60 | Refills: 2 | Status: ACTIVE | COMMUNITY

## 2022-03-21 RX ORDER — ATORVASTATIN CALCIUM 10 MG/1
TAKE 1 TABLET (10 MG) BY ORAL ROUTE ONCE DAILY TABLET, FILM COATED ORAL 1
Qty: 0 | Refills: 0 | Status: ACTIVE | COMMUNITY
Start: 1900-01-01

## 2022-03-21 RX ORDER — PROMETHAZINE HYDROCHLORIDE 25 MG/1
TAKE 1 TABLET (25 MG) BY ORAL ROUTE ONCE DAILY AT BEDTIME FOR 30 DAYS TABLET ORAL
Qty: 30 | Refills: 11 | Status: ACTIVE | COMMUNITY
Start: 2018-09-05

## 2022-03-21 RX ORDER — BAICALIN/CATECHIN 500 MG
CAPSULE ORAL
Qty: 0 | Refills: 0 | Status: ACTIVE | COMMUNITY
Start: 1900-01-01

## 2022-03-21 RX ORDER — OMEPRAZOLE 40 MG/1
TAKE 1 CAPSULE TWICE A DAY 30 MINUTES PRIOR TO FIRST AND LAST MEALS CAPSULE, DELAYED RELEASE ORAL TWICE A DAY
Qty: 180 | Refills: 1 | Status: ACTIVE | COMMUNITY

## 2022-03-21 RX ORDER — VALSARTAN AND HYDROCHLOROTHIAZIDE 320; 25 MG/1; MG/1
TABLET, FILM COATED ORAL
Qty: 0 | Refills: 0 | Status: ACTIVE | COMMUNITY
Start: 1900-01-01

## 2022-03-21 RX ORDER — NEBIVOLOL HYDROCHLORIDE 5 MG/1
1/2 TABLET DAILY TABLET ORAL
Qty: 0 | Refills: 0 | Status: ACTIVE | COMMUNITY
Start: 1900-01-01

## 2022-03-21 RX ORDER — GABAPENTIN 600 MG
TAKE 1 TABLET (600 MG) BY ORAL ROUTE 3 TIMES PER DAY TABLET ORAL
Qty: 0 | Refills: 0 | Status: ACTIVE | COMMUNITY
Start: 1900-01-01

## 2022-03-21 RX ORDER — ASPIRIN 81 MG/1
TAKE 1 TABLET (81 MG) BY ORAL ROUTE ONCE DAILY TABLET, COATED ORAL 1
Qty: 0 | Refills: 0 | Status: ACTIVE | COMMUNITY
Start: 1900-01-01

## 2022-04-15 ENCOUNTER — TELEPHONE ENCOUNTER (OUTPATIENT)
Dept: URBAN - METROPOLITAN AREA CLINIC 54 | Facility: CLINIC | Age: 67
End: 2022-04-15

## 2022-04-15 RX ORDER — OMEPRAZOLE 40 MG/1
TAKE 1 CAPSULE TWICE A DAY 30 MINUTES PRIOR TO FIRST AND LAST MEALS CAPSULE, DELAYED RELEASE ORAL TWICE A DAY
Qty: 180 | Refills: 4

## 2022-04-18 ENCOUNTER — ERX REFILL RESPONSE (OUTPATIENT)
Dept: URBAN - METROPOLITAN AREA CLINIC 54 | Facility: CLINIC | Age: 67
End: 2022-04-18

## 2022-04-18 RX ORDER — FAMOTIDINE 40 MG/1
TAKE 1 TABLET (40 MG) BY ORAL ROUTE ONCE DAILY AT BEDTIME TABLET ORAL 1
Qty: 90 | Refills: 1 | OUTPATIENT

## 2022-04-18 RX ORDER — FAMOTIDINE 40 MG/1
TAKE 1 TABLET (40 MG) BY ORAL ROUTE ONCE DAILY AT BEDTIME 1 ORAL 90 DAYS TABLET, FILM COATED ORAL
Qty: 90 TABLET | Refills: 2 | OUTPATIENT

## 2022-05-09 ENCOUNTER — CLAIMS CREATED FROM THE CLAIM WINDOW (OUTPATIENT)
Dept: URBAN - METROPOLITAN AREA CLINIC 4 | Facility: CLINIC | Age: 67
End: 2022-05-09
Payer: MEDICARE

## 2022-05-09 ENCOUNTER — OFFICE VISIT (OUTPATIENT)
Dept: URBAN - METROPOLITAN AREA SURGERY CENTER 14 | Facility: SURGERY CENTER | Age: 67
End: 2022-05-09
Payer: MEDICARE

## 2022-05-09 DIAGNOSIS — K31.89 ACQUIRED DEFORMITY OF DUODENUM: ICD-10-CM

## 2022-05-09 DIAGNOSIS — K31.89 FOCAL FOVEOLAR HYPERPLASIA: ICD-10-CM

## 2022-05-09 DIAGNOSIS — R11.0 AM NAUSEA: ICD-10-CM

## 2022-05-09 PROCEDURE — G8907 PT DOC NO EVENTS ON DISCHARG: HCPCS | Performed by: INTERNAL MEDICINE

## 2022-05-09 PROCEDURE — 88305 TISSUE EXAM BY PATHOLOGIST: CPT | Performed by: PATHOLOGY

## 2022-05-09 PROCEDURE — 88312 SPECIAL STAINS GROUP 1: CPT | Performed by: PATHOLOGY

## 2022-05-09 PROCEDURE — 43239 EGD BIOPSY SINGLE/MULTIPLE: CPT | Performed by: INTERNAL MEDICINE

## 2022-05-09 RX ORDER — NEBIVOLOL HYDROCHLORIDE 5 MG/1
1/2 TABLET DAILY TABLET ORAL
Qty: 0 | Refills: 0 | Status: ACTIVE | COMMUNITY
Start: 1900-01-01

## 2022-05-09 RX ORDER — GABAPENTIN 600 MG
TAKE 1 TABLET (600 MG) BY ORAL ROUTE 3 TIMES PER DAY TABLET ORAL
Qty: 0 | Refills: 0 | Status: ACTIVE | COMMUNITY
Start: 1900-01-01

## 2022-05-09 RX ORDER — SERTRALINE 100 MG/1
TABLET, FILM COATED ORAL
Qty: 0 | Refills: 0 | Status: ACTIVE | COMMUNITY
Start: 1900-01-01

## 2022-05-09 RX ORDER — OMEPRAZOLE 40 MG/1
TAKE 1 CAPSULE TWICE A DAY 30 MINUTES PRIOR TO FIRST AND LAST MEALS CAPSULE, DELAYED RELEASE ORAL TWICE A DAY
Qty: 180 | Refills: 4 | Status: ACTIVE | COMMUNITY

## 2022-05-09 RX ORDER — VALSARTAN AND HYDROCHLOROTHIAZIDE 320; 25 MG/1; MG/1
TABLET, FILM COATED ORAL
Qty: 0 | Refills: 0 | Status: ACTIVE | COMMUNITY
Start: 1900-01-01

## 2022-05-09 RX ORDER — METOCLOPRAMIDE 5 MG/1
TAKE 1 TABLET BY ORAL ROUTE TABLET ORAL TWICE A DAY
Qty: 60 | Refills: 2 | Status: ACTIVE | COMMUNITY

## 2022-05-09 RX ORDER — AMLODIPINE BESYLATE 10 MG/1
TAKE 1 TABLET (10 MG) BY ORAL ROUTE ONCE DAILY TABLET ORAL 1
Qty: 0 | Refills: 0 | Status: ACTIVE | COMMUNITY
Start: 1900-01-01

## 2022-05-09 RX ORDER — ASPIRIN 81 MG/1
TAKE 1 TABLET (81 MG) BY ORAL ROUTE ONCE DAILY TABLET, COATED ORAL 1
Qty: 0 | Refills: 0 | Status: ACTIVE | COMMUNITY
Start: 1900-01-01

## 2022-05-09 RX ORDER — TIZANIDINE HYDROCHLORIDE 4 MG/1
CAPSULE ORAL
Qty: 0 | Refills: 0 | Status: ACTIVE | COMMUNITY
Start: 1900-01-01

## 2022-05-09 RX ORDER — HYDRALAZINE HYDROCHLORIDE 25 MG/1
TABLET ORAL
Qty: 0 | Refills: 0 | Status: ACTIVE | COMMUNITY
Start: 1900-01-01

## 2022-05-09 RX ORDER — BAICALIN/CATECHIN 500 MG
CAPSULE ORAL
Qty: 0 | Refills: 0 | Status: ACTIVE | COMMUNITY
Start: 1900-01-01

## 2022-05-09 RX ORDER — METOCLOPRAMIDE HYDROCHLORIDE 5 MG/1
1 TABLET BEFORE MEALS TABLET ORAL TWICE A DAY
Qty: 60 TABLET | Refills: 0 | Status: ACTIVE | COMMUNITY

## 2022-05-09 RX ORDER — ATORVASTATIN CALCIUM 10 MG/1
TAKE 1 TABLET (10 MG) BY ORAL ROUTE ONCE DAILY TABLET, FILM COATED ORAL 1
Qty: 0 | Refills: 0 | Status: ACTIVE | COMMUNITY
Start: 1900-01-01

## 2022-05-09 RX ORDER — FAMOTIDINE 40 MG/1
TAKE 1 TABLET (40 MG) BY ORAL ROUTE ONCE DAILY AT BEDTIME 1 ORAL 90 DAYS TABLET, FILM COATED ORAL
Qty: 90 TABLET | Refills: 2 | Status: ACTIVE | COMMUNITY

## 2022-05-09 RX ORDER — PROMETHAZINE HYDROCHLORIDE 25 MG/1
TAKE 1 TABLET (25 MG) BY ORAL ROUTE ONCE DAILY AT BEDTIME FOR 30 DAYS TABLET ORAL
Qty: 30 | Refills: 11 | Status: ACTIVE | COMMUNITY
Start: 2018-09-05

## 2022-06-07 ENCOUNTER — OFFICE VISIT (OUTPATIENT)
Dept: RURAL CLINIC 6 | Facility: CLINIC | Age: 67
End: 2022-06-07

## 2022-06-07 RX ORDER — OMEPRAZOLE 40 MG/1
TAKE 1 CAPSULE TWICE A DAY 30 MINUTES PRIOR TO FIRST AND LAST MEALS CAPSULE, DELAYED RELEASE ORAL TWICE A DAY
Qty: 180 | Refills: 4 | COMMUNITY

## 2022-06-07 RX ORDER — METOCLOPRAMIDE HYDROCHLORIDE 5 MG/1
1 TABLET BEFORE MEALS TABLET ORAL TWICE A DAY
Qty: 60 TABLET | Refills: 0 | COMMUNITY

## 2022-06-07 RX ORDER — METOCLOPRAMIDE 5 MG/1
TAKE 1 TABLET BY ORAL ROUTE TABLET ORAL TWICE A DAY
Qty: 60 | Refills: 2 | COMMUNITY

## 2022-06-07 RX ORDER — GABAPENTIN 600 MG
TAKE 1 TABLET (600 MG) BY ORAL ROUTE 3 TIMES PER DAY TABLET ORAL
Qty: 0 | Refills: 0 | COMMUNITY
Start: 1900-01-01

## 2022-06-07 RX ORDER — ASPIRIN 81 MG/1
TAKE 1 TABLET (81 MG) BY ORAL ROUTE ONCE DAILY TABLET, COATED ORAL 1
Qty: 0 | Refills: 0 | COMMUNITY
Start: 1900-01-01

## 2022-06-07 RX ORDER — NEBIVOLOL HYDROCHLORIDE 5 MG/1
1/2 TABLET DAILY TABLET ORAL
Qty: 0 | Refills: 0 | COMMUNITY
Start: 1900-01-01

## 2022-06-07 RX ORDER — ATORVASTATIN CALCIUM 10 MG/1
TAKE 1 TABLET (10 MG) BY ORAL ROUTE ONCE DAILY TABLET, FILM COATED ORAL 1
Qty: 0 | Refills: 0 | COMMUNITY
Start: 1900-01-01

## 2022-06-07 RX ORDER — AMLODIPINE BESYLATE 10 MG/1
TAKE 1 TABLET (10 MG) BY ORAL ROUTE ONCE DAILY TABLET ORAL 1
Qty: 0 | Refills: 0 | COMMUNITY
Start: 1900-01-01

## 2022-06-07 RX ORDER — BAICALIN/CATECHIN 500 MG
CAPSULE ORAL
Qty: 0 | Refills: 0 | COMMUNITY
Start: 1900-01-01

## 2022-06-07 RX ORDER — HYDRALAZINE HYDROCHLORIDE 25 MG/1
TABLET ORAL
Qty: 0 | Refills: 0 | COMMUNITY
Start: 1900-01-01

## 2022-06-07 RX ORDER — TIZANIDINE HYDROCHLORIDE 4 MG/1
CAPSULE ORAL
Qty: 0 | Refills: 0 | COMMUNITY
Start: 1900-01-01

## 2022-06-07 RX ORDER — VALSARTAN AND HYDROCHLOROTHIAZIDE 320; 25 MG/1; MG/1
TABLET, FILM COATED ORAL
Qty: 0 | Refills: 0 | COMMUNITY
Start: 1900-01-01

## 2022-06-07 RX ORDER — PROMETHAZINE HYDROCHLORIDE 25 MG/1
TAKE 1 TABLET (25 MG) BY ORAL ROUTE ONCE DAILY AT BEDTIME FOR 30 DAYS TABLET ORAL
Qty: 30 | Refills: 11 | COMMUNITY
Start: 2018-09-05

## 2022-06-07 RX ORDER — FAMOTIDINE 40 MG/1
TAKE 1 TABLET (40 MG) BY ORAL ROUTE ONCE DAILY AT BEDTIME 1 ORAL 90 DAYS TABLET, FILM COATED ORAL
Qty: 90 TABLET | Refills: 2 | COMMUNITY

## 2022-06-07 RX ORDER — SERTRALINE 100 MG/1
TABLET, FILM COATED ORAL
Qty: 0 | Refills: 0 | COMMUNITY
Start: 1900-01-01

## 2022-08-30 ENCOUNTER — OFFICE VISIT (OUTPATIENT)
Dept: RURAL CLINIC 6 | Facility: CLINIC | Age: 67
End: 2022-08-30

## 2022-10-04 ENCOUNTER — OFFICE VISIT (OUTPATIENT)
Dept: RURAL CLINIC 6 | Facility: CLINIC | Age: 67
End: 2022-10-04
Payer: MEDICARE

## 2022-10-04 VITALS
HEIGHT: 74 IN | WEIGHT: 277 LBS | BODY MASS INDEX: 35.55 KG/M2 | DIASTOLIC BLOOD PRESSURE: 93 MMHG | SYSTOLIC BLOOD PRESSURE: 155 MMHG | HEART RATE: 70 BPM | TEMPERATURE: 97.6 F

## 2022-10-04 DIAGNOSIS — K21.9 GERD WITHOUT ESOPHAGITIS: ICD-10-CM

## 2022-10-04 DIAGNOSIS — K31.84 GASTROPARESIS: ICD-10-CM

## 2022-10-04 PROBLEM — 266435005: Status: ACTIVE | Noted: 2022-02-15

## 2022-10-04 PROCEDURE — 99213 OFFICE O/P EST LOW 20 MIN: CPT | Performed by: INTERNAL MEDICINE

## 2022-10-04 RX ORDER — ATORVASTATIN CALCIUM 10 MG/1
TAKE 1 TABLET (10 MG) BY ORAL ROUTE ONCE DAILY TABLET, FILM COATED ORAL 1
Qty: 0 | Refills: 0 | Status: ACTIVE | COMMUNITY
Start: 1900-01-01

## 2022-10-04 RX ORDER — NEBIVOLOL HYDROCHLORIDE 5 MG/1
1/2 TABLET DAILY TABLET ORAL
Qty: 0 | Refills: 0 | Status: ACTIVE | COMMUNITY
Start: 1900-01-01

## 2022-10-04 RX ORDER — BAICALIN/CATECHIN 500 MG
CAPSULE ORAL
Qty: 0 | Refills: 0 | Status: DISCONTINUED | COMMUNITY
Start: 1900-01-01

## 2022-10-04 RX ORDER — FAMOTIDINE 40 MG/1
TAKE 1 TABLET (40 MG) BY ORAL ROUTE ONCE DAILY AT BEDTIME 1 ORAL 90 DAYS TABLET, FILM COATED ORAL
Qty: 90 TABLET | Refills: 2 | Status: DISCONTINUED | COMMUNITY

## 2022-10-04 RX ORDER — TIZANIDINE HYDROCHLORIDE 4 MG/1
CAPSULE ORAL
Qty: 0 | Refills: 0 | Status: ACTIVE | COMMUNITY
Start: 1900-01-01

## 2022-10-04 RX ORDER — OMEPRAZOLE 40 MG/1
TAKE 1 CAPSULE TWICE A DAY 30 MINUTES PRIOR TO FIRST AND LAST MEALS CAPSULE, DELAYED RELEASE ORAL TWICE A DAY
Qty: 180 | Refills: 4 | Status: ACTIVE | COMMUNITY

## 2022-10-04 RX ORDER — AMLODIPINE BESYLATE 10 MG/1
TAKE 1 TABLET (10 MG) BY ORAL ROUTE ONCE DAILY TABLET ORAL 1
Qty: 0 | Refills: 0 | Status: ACTIVE | COMMUNITY
Start: 1900-01-01

## 2022-10-04 RX ORDER — GABAPENTIN 600 MG
TAKE 1 TABLET (600 MG) BY ORAL ROUTE 3 TIMES PER DAY TABLET ORAL
Qty: 0 | Refills: 0 | Status: DISCONTINUED | COMMUNITY
Start: 1900-01-01

## 2022-10-04 RX ORDER — METOCLOPRAMIDE 5 MG/1
TAKE 1 TABLET BY ORAL ROUTE TABLET ORAL TWICE A DAY
Qty: 60 | Refills: 2 | Status: DISCONTINUED | COMMUNITY

## 2022-10-04 RX ORDER — HYDRALAZINE HYDROCHLORIDE 25 MG/1
TABLET ORAL
Qty: 0 | Refills: 0 | Status: DISCONTINUED | COMMUNITY
Start: 1900-01-01

## 2022-10-04 RX ORDER — METOCLOPRAMIDE HYDROCHLORIDE 5 MG/1
1 TABLET BEFORE MEALS TABLET ORAL TWICE A DAY
Qty: 60 TABLET | Refills: 0 | Status: DISCONTINUED | COMMUNITY

## 2022-10-04 RX ORDER — ASPIRIN 81 MG/1
TAKE 1 TABLET (81 MG) BY ORAL ROUTE ONCE DAILY TABLET, COATED ORAL 1
Qty: 0 | Refills: 0 | Status: ACTIVE | COMMUNITY
Start: 1900-01-01

## 2022-10-04 RX ORDER — PROMETHAZINE HYDROCHLORIDE 25 MG/1
TAKE 1 TABLET (25 MG) BY ORAL ROUTE ONCE DAILY AT BEDTIME FOR 30 DAYS TABLET ORAL
Qty: 30 | Refills: 11 | Status: ACTIVE | COMMUNITY
Start: 2018-09-05

## 2022-10-04 RX ORDER — VALSARTAN AND HYDROCHLOROTHIAZIDE 320; 25 MG/1; MG/1
TABLET, FILM COATED ORAL
Qty: 0 | Refills: 0 | Status: ACTIVE | COMMUNITY
Start: 1900-01-01

## 2022-10-04 RX ORDER — OMEPRAZOLE 40 MG/1
TAKE 1 CAPSULE TWICE A DAY 30 MINUTES PRIOR TO FIRST AND LAST MEALS CAPSULE, DELAYED RELEASE ORAL ONCE A DAY
Qty: 90 | Refills: 4

## 2022-10-04 RX ORDER — SERTRALINE 100 MG/1
TABLET, FILM COATED ORAL
Qty: 0 | Refills: 0 | Status: ACTIVE | COMMUNITY
Start: 1900-01-01

## 2022-10-04 NOTE — HPI-TODAY'S VISIT:
He has been able toTo discontinue the metoclopramide.  negative upper endoscopy earlier this year negative colonoscopy earlier this year.  Regular bowel movements.  He has gradually gained weight.  He had recent knee surgery and is planning to go back to the bariatric surgeon to discuss bariatric surgery.  No other major health changes.    He does  have occasional retrosternal burning and is on PPI twice daily.

## 2022-10-17 ENCOUNTER — ERX REFILL RESPONSE (OUTPATIENT)
Dept: URBAN - METROPOLITAN AREA CLINIC 54 | Facility: CLINIC | Age: 67
End: 2022-10-17

## 2022-10-17 RX ORDER — FAMOTIDINE 40 MG/1
TAKE 1 TABLET (40 MG) BY ORAL ROUTE ONCE DAILY AT BEDTIME TABLET, FILM COATED ORAL
Qty: 90 TABLET | Refills: 2 | OUTPATIENT

## 2022-10-17 RX ORDER — FAMOTIDINE 40 MG/1
TAKE 1 TABLET (40 MG) BY ORAL ROUTE ONCE DAILY AT BEDTIME TABLET, FILM COATED ORAL
Qty: 90 TABLET | Refills: 1 | OUTPATIENT

## 2023-03-02 ENCOUNTER — TELEPHONE ENCOUNTER (OUTPATIENT)
Dept: URBAN - METROPOLITAN AREA CLINIC 54 | Facility: CLINIC | Age: 68
End: 2023-03-02

## 2023-03-02 RX ORDER — NEBIVOLOL HYDROCHLORIDE 5 MG/1
1/2 TABLET DAILY TABLET ORAL
Qty: 0 | Refills: 0 | Status: ACTIVE | COMMUNITY
Start: 1900-01-01

## 2023-03-02 RX ORDER — ATORVASTATIN CALCIUM 10 MG/1
TAKE 1 TABLET (10 MG) BY ORAL ROUTE ONCE DAILY TABLET, FILM COATED ORAL 1
Qty: 0 | Refills: 0 | Status: ACTIVE | COMMUNITY
Start: 1900-01-01

## 2023-03-02 RX ORDER — HYOSCYAMINE SULFATE 0.125 MG
1 TABLET ON THE TONGUE AND ALLOW TO DISSOLVE  AS NEEDED TABLET,DISINTEGRATING ORAL
Qty: 40 | Refills: 1 | OUTPATIENT
Start: 2023-03-02 | End: 2023-05-01

## 2023-03-02 RX ORDER — VALSARTAN AND HYDROCHLOROTHIAZIDE 320; 25 MG/1; MG/1
TABLET, FILM COATED ORAL
Qty: 0 | Refills: 0 | Status: ACTIVE | COMMUNITY
Start: 1900-01-01

## 2023-03-02 RX ORDER — FAMOTIDINE 40 MG/1
TAKE 1 TABLET (40 MG) BY ORAL ROUTE ONCE DAILY AT BEDTIME TABLET, FILM COATED ORAL
Qty: 90 TABLET | Refills: 1 | Status: ACTIVE | COMMUNITY

## 2023-03-02 RX ORDER — TIZANIDINE HYDROCHLORIDE 4 MG/1
CAPSULE ORAL
Qty: 0 | Refills: 0 | Status: ACTIVE | COMMUNITY
Start: 1900-01-01

## 2023-03-02 RX ORDER — PROMETHAZINE HYDROCHLORIDE 25 MG/1
TAKE 1 TABLET (25 MG) BY ORAL ROUTE ONCE DAILY AT BEDTIME FOR 30 DAYS TABLET ORAL
Qty: 30 | Refills: 11 | Status: ACTIVE | COMMUNITY
Start: 2018-09-05

## 2023-03-02 RX ORDER — ASPIRIN 81 MG/1
TAKE 1 TABLET (81 MG) BY ORAL ROUTE ONCE DAILY TABLET, COATED ORAL 1
Qty: 0 | Refills: 0 | Status: ACTIVE | COMMUNITY
Start: 1900-01-01

## 2023-03-02 RX ORDER — OMEPRAZOLE 40 MG/1
TAKE 1 CAPSULE TWICE A DAY 30 MINUTES PRIOR TO FIRST AND LAST MEALS CAPSULE, DELAYED RELEASE ORAL ONCE A DAY
Qty: 90 | Refills: 4 | Status: ACTIVE | COMMUNITY

## 2023-03-02 RX ORDER — AMLODIPINE BESYLATE 10 MG/1
TAKE 1 TABLET (10 MG) BY ORAL ROUTE ONCE DAILY TABLET ORAL 1
Qty: 0 | Refills: 0 | Status: ACTIVE | COMMUNITY
Start: 1900-01-01

## 2023-03-02 RX ORDER — SERTRALINE 100 MG/1
TABLET, FILM COATED ORAL
Qty: 0 | Refills: 0 | Status: ACTIVE | COMMUNITY
Start: 1900-01-01

## 2023-03-03 ENCOUNTER — TELEPHONE ENCOUNTER (OUTPATIENT)
Dept: URBAN - METROPOLITAN AREA CLINIC 23 | Facility: CLINIC | Age: 68
End: 2023-03-03

## 2023-03-07 ENCOUNTER — OFFICE VISIT (OUTPATIENT)
Dept: RURAL CLINIC 6 | Facility: CLINIC | Age: 68
End: 2023-03-07

## 2023-03-07 RX ORDER — OMEPRAZOLE 40 MG/1
TAKE 1 CAPSULE TWICE A DAY 30 MINUTES PRIOR TO FIRST AND LAST MEALS CAPSULE, DELAYED RELEASE ORAL ONCE A DAY
Qty: 90 | Refills: 4 | COMMUNITY

## 2023-03-07 RX ORDER — HYOSCYAMINE SULFATE 0.125 MG
1 TABLET ON THE TONGUE AND ALLOW TO DISSOLVE  AS NEEDED TABLET,DISINTEGRATING ORAL
Qty: 40 | Refills: 1 | COMMUNITY
Start: 2023-03-02 | End: 2023-05-01

## 2023-03-07 RX ORDER — ASPIRIN 81 MG/1
TAKE 1 TABLET (81 MG) BY ORAL ROUTE ONCE DAILY TABLET, COATED ORAL 1
Qty: 0 | Refills: 0 | COMMUNITY
Start: 1900-01-01

## 2023-03-07 RX ORDER — AMLODIPINE BESYLATE 10 MG/1
TAKE 1 TABLET (10 MG) BY ORAL ROUTE ONCE DAILY TABLET ORAL 1
Qty: 0 | Refills: 0 | COMMUNITY
Start: 1900-01-01

## 2023-03-07 RX ORDER — VALSARTAN AND HYDROCHLOROTHIAZIDE 320; 25 MG/1; MG/1
TABLET, FILM COATED ORAL
Qty: 0 | Refills: 0 | COMMUNITY
Start: 1900-01-01

## 2023-03-07 RX ORDER — TIZANIDINE HYDROCHLORIDE 4 MG/1
CAPSULE ORAL
Qty: 0 | Refills: 0 | COMMUNITY
Start: 1900-01-01

## 2023-03-07 RX ORDER — NEBIVOLOL HYDROCHLORIDE 5 MG/1
1/2 TABLET DAILY TABLET ORAL
Qty: 0 | Refills: 0 | COMMUNITY
Start: 1900-01-01

## 2023-03-07 RX ORDER — SERTRALINE 100 MG/1
TABLET, FILM COATED ORAL
Qty: 0 | Refills: 0 | COMMUNITY
Start: 1900-01-01

## 2023-03-07 RX ORDER — FAMOTIDINE 40 MG/1
TAKE 1 TABLET (40 MG) BY ORAL ROUTE ONCE DAILY AT BEDTIME TABLET, FILM COATED ORAL
Qty: 90 TABLET | Refills: 1 | COMMUNITY

## 2023-03-07 RX ORDER — ATORVASTATIN CALCIUM 10 MG/1
TAKE 1 TABLET (10 MG) BY ORAL ROUTE ONCE DAILY TABLET, FILM COATED ORAL 1
Qty: 0 | Refills: 0 | COMMUNITY
Start: 1900-01-01

## 2023-03-07 RX ORDER — PROMETHAZINE HYDROCHLORIDE 25 MG/1
TAKE 1 TABLET (25 MG) BY ORAL ROUTE ONCE DAILY AT BEDTIME FOR 30 DAYS TABLET ORAL
Qty: 30 | Refills: 11 | COMMUNITY
Start: 2018-09-05

## 2023-03-29 ENCOUNTER — OFFICE VISIT (OUTPATIENT)
Dept: URBAN - METROPOLITAN AREA CLINIC 54 | Facility: CLINIC | Age: 68
End: 2023-03-29
Payer: MEDICARE

## 2023-03-29 VITALS
HEIGHT: 74 IN | BODY MASS INDEX: 39.53 KG/M2 | HEART RATE: 77 BPM | TEMPERATURE: 97.6 F | SYSTOLIC BLOOD PRESSURE: 133 MMHG | DIASTOLIC BLOOD PRESSURE: 77 MMHG | WEIGHT: 308 LBS

## 2023-03-29 DIAGNOSIS — K31.84 GASTROPARESIS: ICD-10-CM

## 2023-03-29 DIAGNOSIS — K21.9 GERD WITHOUT ESOPHAGITIS: ICD-10-CM

## 2023-03-29 PROCEDURE — 99213 OFFICE O/P EST LOW 20 MIN: CPT

## 2023-03-29 RX ORDER — VALSARTAN AND HYDROCHLOROTHIAZIDE 320; 25 MG/1; MG/1
TABLET, FILM COATED ORAL
Qty: 0 | Refills: 0 | Status: ACTIVE | COMMUNITY
Start: 1900-01-01

## 2023-03-29 RX ORDER — SERTRALINE 100 MG/1
TABLET, FILM COATED ORAL
Qty: 0 | Refills: 0 | Status: ACTIVE | COMMUNITY
Start: 1900-01-01

## 2023-03-29 RX ORDER — ASPIRIN 81 MG/1
TAKE 1 TABLET (81 MG) BY ORAL ROUTE ONCE DAILY TABLET, COATED ORAL 1
Qty: 0 | Refills: 0 | Status: ACTIVE | COMMUNITY
Start: 1900-01-01

## 2023-03-29 RX ORDER — ATORVASTATIN CALCIUM 10 MG/1
TAKE 1 TABLET (10 MG) BY ORAL ROUTE ONCE DAILY TABLET, FILM COATED ORAL 1
Qty: 0 | Refills: 0 | Status: ACTIVE | COMMUNITY
Start: 1900-01-01

## 2023-03-29 RX ORDER — NEBIVOLOL HYDROCHLORIDE 5 MG/1
1/2 TABLET DAILY TABLET ORAL
Qty: 0 | Refills: 0 | Status: ACTIVE | COMMUNITY
Start: 1900-01-01

## 2023-03-29 RX ORDER — TIZANIDINE HYDROCHLORIDE 4 MG/1
CAPSULE ORAL
Qty: 0 | Refills: 0 | Status: ACTIVE | COMMUNITY
Start: 1900-01-01

## 2023-03-29 RX ORDER — OMEPRAZOLE 40 MG/1
TAKE 1 CAPSULE TWICE A DAY 30 MINUTES PRIOR TO FIRST AND LAST MEALS CAPSULE, DELAYED RELEASE ORAL ONCE A DAY
Qty: 90 | Refills: 4 | Status: ACTIVE | COMMUNITY

## 2023-03-29 RX ORDER — FAMOTIDINE 40 MG/1
TAKE 1 TABLET (40 MG) BY ORAL ROUTE ONCE DAILY AT BEDTIME TABLET, FILM COATED ORAL
Qty: 90 TABLET | Refills: 1 | Status: ACTIVE | COMMUNITY

## 2023-03-29 RX ORDER — AMLODIPINE BESYLATE 10 MG/1
TAKE 1 TABLET (10 MG) BY ORAL ROUTE ONCE DAILY TABLET ORAL 1
Qty: 0 | Refills: 0 | Status: ACTIVE | COMMUNITY
Start: 1900-01-01

## 2023-03-29 NOTE — HPI-TODAY'S VISIT:
10/4/22: He has been able toTo discontinue the metoclopramide.  negative upper endoscopy earlier this year negative colonoscopy earlier this year.  Regular bowel movements.  He has gradually gained weight.  He had recent knee surgery and is planning to go back to the bariatric surgeon to discuss bariatric surgery.  No other major health changes.    He does  have occasional retrosternal burning and is on PPI twice daily.  3/29/23 Follow Up: Pt with hx of gastroparesis complaining of symptom flare with post prandial fullness lasting hours after a meal. Mild nausea without vomiting. Eating 2 medium sized meals a day. On metoclopramide (?5 or 10mg) once a day at night, but states his doctors want him off. Has gastric bypass scheduled next month on 4/18. Supposed to start clear liquid diet 2 weeks prior, next week. EGD and cscope were normal 1 year ago.

## 2024-01-23 ENCOUNTER — TELEPHONE ENCOUNTER (OUTPATIENT)
Dept: URBAN - METROPOLITAN AREA CLINIC 54 | Facility: CLINIC | Age: 69
End: 2024-01-23

## 2024-01-25 ENCOUNTER — OFFICE VISIT (OUTPATIENT)
Dept: URBAN - METROPOLITAN AREA CLINIC 54 | Facility: CLINIC | Age: 69
End: 2024-01-25
Payer: MEDICARE

## 2024-01-25 VITALS
HEART RATE: 69 BPM | HEIGHT: 74 IN | DIASTOLIC BLOOD PRESSURE: 62 MMHG | WEIGHT: 198 LBS | TEMPERATURE: 97.5 F | BODY MASS INDEX: 25.41 KG/M2 | SYSTOLIC BLOOD PRESSURE: 105 MMHG

## 2024-01-25 DIAGNOSIS — K62.89 ANORECTAL PAIN: ICD-10-CM

## 2024-01-25 DIAGNOSIS — K31.84 GASTROPARESIS: ICD-10-CM

## 2024-01-25 DIAGNOSIS — K59.09 CHANGE IN BOWEL MOVEMENTS INTERMITTENT CONSTIPATION. URGENCY IN THE MORNING.: ICD-10-CM

## 2024-01-25 PROBLEM — 14760008: Status: ACTIVE | Noted: 2024-01-25

## 2024-01-25 PROCEDURE — 99214 OFFICE O/P EST MOD 30 MIN: CPT

## 2024-01-25 RX ORDER — SERTRALINE 100 MG/1
TABLET, FILM COATED ORAL
Qty: 0 | Refills: 0 | Status: ACTIVE | COMMUNITY
Start: 1900-01-01

## 2024-01-25 RX ORDER — FAMOTIDINE 40 MG/1
TAKE 1 TABLET (40 MG) BY ORAL ROUTE ONCE DAILY AT BEDTIME TABLET, FILM COATED ORAL
Qty: 90 TABLET | Refills: 1 | Status: ON HOLD | COMMUNITY

## 2024-01-25 RX ORDER — MULTIVIT-MIN/IRON/FOLIC ACID/K 45-800-120
AS DIRECTED CAPSULE ORAL
Status: ACTIVE | COMMUNITY

## 2024-01-25 RX ORDER — ASPIRIN 81 MG/1
TAKE 1 TABLET (81 MG) BY ORAL ROUTE ONCE DAILY TABLET, COATED ORAL 1
Qty: 0 | Refills: 0 | Status: ON HOLD | COMMUNITY
Start: 1900-01-01

## 2024-01-25 RX ORDER — VALSARTAN AND HYDROCHLOROTHIAZIDE 320; 25 MG/1; MG/1
TABLET, FILM COATED ORAL
Qty: 0 | Refills: 0 | Status: ACTIVE | COMMUNITY
Start: 1900-01-01

## 2024-01-25 RX ORDER — TIZANIDINE HYDROCHLORIDE 4 MG/1
CAPSULE ORAL
Qty: 0 | Refills: 0 | Status: ACTIVE | COMMUNITY
Start: 1900-01-01

## 2024-01-25 RX ORDER — AMLODIPINE BESYLATE 10 MG/1
TAKE 1 TABLET (10 MG) BY ORAL ROUTE ONCE DAILY TABLET ORAL 1
Qty: 0 | Refills: 0 | Status: ACTIVE | COMMUNITY
Start: 1900-01-01

## 2024-01-25 RX ORDER — OMEPRAZOLE 40 MG/1
TAKE 1 CAPSULE TWICE A DAY 30 MINUTES PRIOR TO FIRST AND LAST MEALS CAPSULE, DELAYED RELEASE ORAL ONCE A DAY
Qty: 90 | Refills: 4 | Status: ACTIVE | COMMUNITY

## 2024-01-25 RX ORDER — NEBIVOLOL HYDROCHLORIDE 5 MG/1
1/2 TABLET DAILY TABLET ORAL
Qty: 0 | Refills: 0 | Status: ACTIVE | COMMUNITY
Start: 1900-01-01

## 2024-01-25 RX ORDER — ATORVASTATIN CALCIUM 10 MG/1
TAKE 1 TABLET (10 MG) BY ORAL ROUTE ONCE DAILY TABLET, FILM COATED ORAL 1
Qty: 0 | Refills: 0 | Status: ACTIVE | COMMUNITY
Start: 1900-01-01

## 2024-01-25 NOTE — HPI-TODAY'S VISIT:
10/4/22: He has been able toTo discontinue the metoclopramide.  negative upper endoscopy earlier this year negative colonoscopy earlier this year.  Regular bowel movements.  He has gradually gained weight.  He had recent knee surgery and is planning to go back to the bariatric surgeon to discuss bariatric surgery.  No other major health changes.    He does  have occasional retrosternal burning and is on PPI twice daily.  3/29/23 Follow Up: Pt with hx of gastroparesis complaining of symptom flare with post prandial fullness lasting hours after a meal. Mild nausea without vomiting. Eating 2 medium sized meals a day. On metoclopramide (?5 or 10mg) once a day at night, but states his doctors want him off. Has gastric bypass scheduled next month on 4/18. Supposed to start clear liquid diet 2 weeks prior, next week. EGD and cscope were normal 1 year ago.  1/25/24 Follow Up: Pt presents for constipation and anorectal burning during and after defecation x 1-2 months. Having a large, hard BM every 1-2 days. Scant red blood on tissue today, but no rectal bleeding prior. Tried topical vaseline and PrepH without relief. Taking stool softeners without improvement of constipation. Added a half cap of Miralax the last 2 days and had 2 softer BMs this morning with less pain. UTD on colonscopy with a small hyperplastic polyp in 3/2022. Had gastric bypass in April 2023, has lost 125 lbs.

## 2024-03-07 ENCOUNTER — OV EP (OUTPATIENT)
Dept: RURAL CLINIC 6 | Facility: CLINIC | Age: 69
End: 2024-03-07
Payer: MEDICARE

## 2024-03-07 VITALS
WEIGHT: 190 LBS | BODY MASS INDEX: 24.38 KG/M2 | HEART RATE: 63 BPM | TEMPERATURE: 98.2 F | DIASTOLIC BLOOD PRESSURE: 65 MMHG | HEIGHT: 74 IN | SYSTOLIC BLOOD PRESSURE: 107 MMHG

## 2024-03-07 DIAGNOSIS — K31.84 GASTROPARESIS: ICD-10-CM

## 2024-03-07 DIAGNOSIS — K62.89 ANORECTAL PAIN: ICD-10-CM

## 2024-03-07 DIAGNOSIS — K59.01 CONSTIPATION: ICD-10-CM

## 2024-03-07 PROCEDURE — 99214 OFFICE O/P EST MOD 30 MIN: CPT | Performed by: PHYSICIAN ASSISTANT

## 2024-03-07 RX ORDER — OMEPRAZOLE 40 MG/1
TAKE 1 CAPSULE TWICE A DAY 30 MINUTES PRIOR TO FIRST AND LAST MEALS CAPSULE, DELAYED RELEASE ORAL ONCE A DAY
Qty: 90 | Refills: 4 | Status: ACTIVE | COMMUNITY

## 2024-03-07 RX ORDER — TIZANIDINE HYDROCHLORIDE 4 MG/1
CAPSULE ORAL
Qty: 0 | Refills: 0 | Status: ACTIVE | COMMUNITY
Start: 1900-01-01

## 2024-03-07 RX ORDER — FAMOTIDINE 40 MG/1
TAKE 1 TABLET (40 MG) BY ORAL ROUTE ONCE DAILY AT BEDTIME TABLET, FILM COATED ORAL
Qty: 90 TABLET | Refills: 1 | Status: DISCONTINUED | COMMUNITY

## 2024-03-07 RX ORDER — ASPIRIN 81 MG/1
TAKE 1 TABLET (81 MG) BY ORAL ROUTE ONCE DAILY TABLET, COATED ORAL 1
Qty: 0 | Refills: 0 | Status: DISCONTINUED | COMMUNITY
Start: 1900-01-01

## 2024-03-07 RX ORDER — SERTRALINE 100 MG/1
TABLET, FILM COATED ORAL
Qty: 0 | Refills: 0 | Status: ACTIVE | COMMUNITY
Start: 1900-01-01

## 2024-03-07 RX ORDER — AMLODIPINE BESYLATE 10 MG/1
TAKE 1 TABLET (10 MG) BY ORAL ROUTE ONCE DAILY TABLET ORAL 1
Qty: 0 | Refills: 0 | Status: ACTIVE | COMMUNITY
Start: 1900-01-01

## 2024-03-07 RX ORDER — ATORVASTATIN CALCIUM 10 MG/1
TAKE 1 TABLET (10 MG) BY ORAL ROUTE ONCE DAILY TABLET, FILM COATED ORAL 1
Qty: 0 | Refills: 0 | Status: ACTIVE | COMMUNITY
Start: 1900-01-01

## 2024-03-07 RX ORDER — MULTIVIT-MIN/IRON/FOLIC ACID/K 45-800-120
AS DIRECTED CAPSULE ORAL
Status: ACTIVE | COMMUNITY

## 2024-03-07 RX ORDER — VALSARTAN AND HYDROCHLOROTHIAZIDE 320; 25 MG/1; MG/1
TABLET, FILM COATED ORAL
Qty: 0 | Refills: 0 | Status: ACTIVE | COMMUNITY
Start: 1900-01-01

## 2024-03-07 RX ORDER — NEBIVOLOL HYDROCHLORIDE 5 MG/1
1/2 TABLET DAILY TABLET ORAL
Qty: 0 | Refills: 0 | Status: ACTIVE | COMMUNITY
Start: 1900-01-01

## 2024-03-07 NOTE — PHYSICAL EXAM CONSTITUTIONAL:
well developed, well nourished , in no acute distress , ambulating without difficulty , normal communication ability, appears older than stated age.

## 2024-03-07 NOTE — HPI-TODAY'S VISIT:
10/4/22: He has been able toTo discontinue the metoclopramide.  negative upper endoscopy earlier this year negative colonoscopy earlier this year.  Regular bowel movements.  He has gradually gained weight.  He had recent knee surgery and is planning to go back to the bariatric surgeon to discuss bariatric surgery.  No other major health changes.    He does  have occasional retrosternal burning and is on PPI twice daily.  3/29/23 Follow Up: Pt with hx of gastroparesis complaining of symptom flare with post prandial fullness lasting hours after a meal. Mild nausea without vomiting. Eating 2 medium sized meals a day. On metoclopramide (?5 or 10mg) once a day at night, but states his doctors want him off. Has gastric bypass scheduled next month on 4/18. Supposed to start clear liquid diet 2 weeks prior, next week. EGD and cscope were normal 1 year ago.  1/25/24 Follow Up: Pt presents for constipation and anorectal burning during and after defecation x 1-2 months. Having a large, hard BM every 1-2 days. Scant red blood on tissue today, but no rectal bleeding prior. Tried topical vaseline and PrepH without relief. Taking stool softeners without improvement of constipation. Added a half cap of Miralax the last 2 days and had 2 softer BMs this morning with less pain. UTD on colonscopy with a small hyperplastic polyp in 3/2022. Had gastric bypass in April 2023, has lost 125 lbs.  3/6/24: Patient presents for routine follow-up.  He has been taking daily MiraLAX, fiber, and stool softener for constipation with daily BMs. He states if he does not take it, he will have constipation and admits to straining. He was taking nitroglycerin/lidocaine cream for rectal pain for possible anal fissure with significant improvement. Rectal pain/burning recurred 5 days ago after a hard BM with straining.

## 2024-04-18 ENCOUNTER — OV EP (OUTPATIENT)
Dept: RURAL CLINIC 6 | Facility: CLINIC | Age: 69
End: 2024-04-18
Payer: MEDICARE

## 2024-04-18 VITALS
HEART RATE: 69 BPM | HEIGHT: 74 IN | TEMPERATURE: 97.1 F | BODY MASS INDEX: 23.49 KG/M2 | SYSTOLIC BLOOD PRESSURE: 108 MMHG | WEIGHT: 183 LBS | DIASTOLIC BLOOD PRESSURE: 64 MMHG

## 2024-04-18 DIAGNOSIS — K59.00 CONSTIPATION, UNSPECIFIED CONSTIPATION TYPE: ICD-10-CM

## 2024-04-18 DIAGNOSIS — K31.84 GASTROPARESIS: ICD-10-CM

## 2024-04-18 DIAGNOSIS — K62.89 ANORECTAL PAIN: ICD-10-CM

## 2024-04-18 PROCEDURE — 99213 OFFICE O/P EST LOW 20 MIN: CPT | Performed by: PHYSICIAN ASSISTANT

## 2024-04-18 RX ORDER — AMLODIPINE BESYLATE 10 MG/1
TAKE 1 TABLET (10 MG) BY ORAL ROUTE ONCE DAILY TABLET ORAL 1
Qty: 0 | Refills: 0 | Status: ACTIVE | COMMUNITY
Start: 1900-01-01

## 2024-04-18 RX ORDER — NEBIVOLOL HYDROCHLORIDE 5 MG/1
1/2 TABLET DAILY TABLET ORAL
Qty: 0 | Refills: 0 | Status: ACTIVE | COMMUNITY
Start: 1900-01-01

## 2024-04-18 RX ORDER — SERTRALINE 100 MG/1
TABLET, FILM COATED ORAL
Qty: 0 | Refills: 0 | Status: ACTIVE | COMMUNITY
Start: 1900-01-01

## 2024-04-18 RX ORDER — ATORVASTATIN CALCIUM 10 MG/1
TAKE 1 TABLET (10 MG) BY ORAL ROUTE ONCE DAILY TABLET, FILM COATED ORAL 1
Qty: 0 | Refills: 0 | Status: ACTIVE | COMMUNITY
Start: 1900-01-01

## 2024-04-18 RX ORDER — TIZANIDINE HYDROCHLORIDE 4 MG/1
CAPSULE ORAL
Qty: 0 | Refills: 0 | Status: ACTIVE | COMMUNITY
Start: 1900-01-01

## 2024-04-18 RX ORDER — MULTIVIT-MIN/IRON/FOLIC ACID/K 45-800-120
AS DIRECTED CAPSULE ORAL
Status: ACTIVE | COMMUNITY

## 2024-04-18 RX ORDER — OMEPRAZOLE 40 MG/1
TAKE 1 CAPSULE TWICE A DAY 30 MINUTES PRIOR TO FIRST AND LAST MEALS CAPSULE, DELAYED RELEASE ORAL ONCE A DAY
Qty: 90 | Refills: 4 | Status: ACTIVE | COMMUNITY

## 2024-04-18 RX ORDER — VALSARTAN AND HYDROCHLOROTHIAZIDE 320; 25 MG/1; MG/1
TABLET, FILM COATED ORAL
Qty: 0 | Refills: 0 | Status: ACTIVE | COMMUNITY
Start: 1900-01-01

## 2024-04-18 NOTE — HPI-TODAY'S VISIT:
10/4/22: He has been able toTo discontinue the metoclopramide.  negative upper endoscopy earlier this year negative colonoscopy earlier this year.  Regular bowel movements.  He has gradually gained weight.  He had recent knee surgery and is planning to go back to the bariatric surgeon to discuss bariatric surgery.  No other major health changes.    He does  have occasional retrosternal burning and is on PPI twice daily.  3/29/23 Follow Up: Pt with hx of gastroparesis complaining of symptom flare with post prandial fullness lasting hours after a meal. Mild nausea without vomiting. Eating 2 medium sized meals a day. On metoclopramide (?5 or 10mg) once a day at night, but states his doctors want him off. Has gastric bypass scheduled next month on 4/18. Supposed to start clear liquid diet 2 weeks prior, next week. EGD and cscope were normal 1 year ago.  1/25/24 Follow Up: Pt presents for constipation and anorectal burning during and after defecation x 1-2 months. Having a large, hard BM every 1-2 days. Scant red blood on tissue today, but no rectal bleeding prior. Tried topical vaseline and PrepH without relief. Taking stool softeners without improvement of constipation. Added a half cap of Miralax the last 2 days and had 2 softer BMs this morning with less pain. UTD on colonscopy with a small hyperplastic polyp in 3/2022. Had gastric bypass in April 2023, has lost 125 lbs.  3/6/24: Patient presents for routine follow-up.  He has been taking daily MiraLAX, fiber, and stool softener for constipation with daily BMs. He states if he does not take it, he will have constipation and admits to straining. He was taking nitroglycerin/lidocaine cream for rectal pain for possible anal fissure with significant improvement. Rectal pain/burning recurred 5 days ago after a hard BM with straining.  4/18/24: Patient presents for routine follow up of rectal pain with underlying constipation. No constipation when taking daily MiraLAX and fiber. No ongoing rectal pain. Two weeks remaining of cream. Next colonoscopy in 2027.

## 2025-03-13 ENCOUNTER — TELEPHONE ENCOUNTER (OUTPATIENT)
Dept: RURAL CLINIC 6 | Facility: CLINIC | Age: 70
End: 2025-03-13

## 2025-03-13 RX ORDER — ONDANSETRON 4 MG/1
1 TABLET ON THE TONGUE AND ALLOW TO DISSOLVE TABLET, ORALLY DISINTEGRATING ORAL EVERY 8 HOURS
Qty: 90 TABLET | Refills: 5 | OUTPATIENT
Start: 2025-03-13

## 2025-03-20 ENCOUNTER — DASHBOARD ENCOUNTERS (OUTPATIENT)
Age: 70
End: 2025-03-20

## 2025-04-03 ENCOUNTER — OFFICE VISIT (OUTPATIENT)
Dept: RURAL CLINIC 6 | Facility: CLINIC | Age: 70
End: 2025-04-03
Payer: COMMERCIAL

## 2025-04-03 DIAGNOSIS — K59.09 CONSTIPATION: ICD-10-CM

## 2025-04-03 DIAGNOSIS — K31.84 GASTROPARESIS: ICD-10-CM

## 2025-04-03 DIAGNOSIS — K62.89 ANORECTAL PAIN: ICD-10-CM

## 2025-04-03 PROCEDURE — 99214 OFFICE O/P EST MOD 30 MIN: CPT | Performed by: PHYSICIAN ASSISTANT

## 2025-04-03 RX ORDER — OMEPRAZOLE 40 MG/1
TAKE 1 CAPSULE TWICE A DAY 30 MINUTES PRIOR TO FIRST AND LAST MEALS CAPSULE, DELAYED RELEASE ORAL ONCE A DAY
Qty: 90 | Refills: 4 | Status: ACTIVE | COMMUNITY

## 2025-04-03 RX ORDER — ONDANSETRON 4 MG/1
1 TABLET ON THE TONGUE AND ALLOW TO DISSOLVE TABLET, ORALLY DISINTEGRATING ORAL EVERY 8 HOURS
Qty: 90 TABLET | Refills: 5 | Status: ACTIVE | COMMUNITY
Start: 2025-03-13

## 2025-04-03 RX ORDER — NEBIVOLOL HYDROCHLORIDE 5 MG/1
1/2 TABLET DAILY TABLET ORAL
Qty: 0 | Refills: 0 | Status: DISCONTINUED | COMMUNITY
Start: 1900-01-01

## 2025-04-03 RX ORDER — MULTIVIT-MIN/IRON/FOLIC ACID/K 45-800-120
AS DIRECTED CAPSULE ORAL
Status: ACTIVE | COMMUNITY

## 2025-04-03 RX ORDER — SERTRALINE 100 MG/1
TABLET, FILM COATED ORAL
Qty: 0 | Refills: 0 | Status: ACTIVE | COMMUNITY
Start: 1900-01-01

## 2025-04-03 RX ORDER — TIZANIDINE HYDROCHLORIDE 4 MG/1
CAPSULE ORAL
Qty: 0 | Refills: 0 | Status: ACTIVE | COMMUNITY
Start: 1900-01-01

## 2025-04-03 RX ORDER — VALSARTAN AND HYDROCHLOROTHIAZIDE 320; 25 MG/1; MG/1
TABLET, FILM COATED ORAL
Qty: 0 | Refills: 0 | Status: DISCONTINUED | COMMUNITY
Start: 1900-01-01

## 2025-04-03 RX ORDER — ONDANSETRON 4 MG/1
1 TABLET ON THE TONGUE AND ALLOW TO DISSOLVE TABLET, ORALLY DISINTEGRATING ORAL EVERY 8 HOURS
Qty: 90 TABLET | Refills: 5
Start: 2025-03-13

## 2025-04-03 RX ORDER — AMLODIPINE BESYLATE 10 MG/1
TAKE 1 TABLET (10 MG) BY ORAL ROUTE ONCE DAILY TABLET ORAL 1
Qty: 0 | Refills: 0 | Status: DISCONTINUED | COMMUNITY
Start: 1900-01-01

## 2025-04-03 RX ORDER — ATORVASTATIN CALCIUM 10 MG/1
TAKE 1 TABLET (10 MG) BY ORAL ROUTE ONCE DAILY TABLET, FILM COATED ORAL 1
Qty: 0 | Refills: 0 | Status: ACTIVE | COMMUNITY
Start: 1900-01-01

## 2025-04-03 NOTE — HPI-TODAY'S VISIT:
10/4/22: He has been able toTo discontinue the metoclopramide.  negative upper endoscopy earlier this year negative colonoscopy earlier this year.  Regular bowel movements.  He has gradually gained weight.  He had recent knee surgery and is planning to go back to the bariatric surgeon to discuss bariatric surgery.  No other major health changes.    He does  have occasional retrosternal burning and is on PPI twice daily.  3/29/23 Follow Up: Pt with hx of gastroparesis complaining of symptom flare with post prandial fullness lasting hours after a meal. Mild nausea without vomiting. Eating 2 medium sized meals a day. On metoclopramide (?5 or 10mg) once a day at night, but states his doctors want him off. Has gastric bypass scheduled next month on 4/18. Supposed to start clear liquid diet 2 weeks prior, next week. EGD and cscope were normal 1 year ago.  1/25/24 Follow Up: Pt presents for constipation and anorectal burning during and after defecation x 1-2 months. Having a large, hard BM every 1-2 days. Scant red blood on tissue today, but no rectal bleeding prior. Tried topical vaseline and PrepH without relief. Taking stool softeners without improvement of constipation. Added a half cap of Miralax the last 2 days and had 2 softer BMs this morning with less pain. UTD on colonscopy with a small hyperplastic polyp in 3/2022. Had gastric bypass in April 2023, has lost 125 lbs.  3/6/24: Patient presents for routine follow-up.  He has been taking daily MiraLAX, fiber, and stool softener for constipation with daily BMs. He states if he does not take it, he will have constipation and admits to straining. He was taking nitroglycerin/lidocaine cream for rectal pain for possible anal fissure with significant improvement. Rectal pain/burning recurred 5 days ago after a hard BM with straining.  4/18/24: Patient presents for routine follow up of rectal pain with underlying constipation. No constipation when taking daily MiraLAX and fiber. No ongoing rectal pain. Two weeks remaining of cream. Next colonoscopy in 2027.  4/3/2025: Patient presents for routine follow-up.  He was instructed to take daily MiraLAX, stool softeners, and fiber for underlying constipation with continued daily BMs.  He has infrequent nausea but is alleviated with Zofran.  No vomiting or abdominal pain.  Weight is stable.  He did fracture his left ankle requiring surgical repair approximately 7 weeks ago.  Currently in a boot and has follow-up with Ortho tomorrow.

## 2025-04-03 NOTE — PHYSICAL EXAM CONSTITUTIONAL:
well developed, well nourished , in no acute distress , ambulating with crutches in a boot, normal communication ability, appears older than stated age.